# Patient Record
Sex: FEMALE | Race: WHITE | NOT HISPANIC OR LATINO | Employment: UNEMPLOYED | URBAN - NONMETROPOLITAN AREA
[De-identification: names, ages, dates, MRNs, and addresses within clinical notes are randomized per-mention and may not be internally consistent; named-entity substitution may affect disease eponyms.]

---

## 2018-06-18 ENCOUNTER — HISTORICAL (OUTPATIENT)
Dept: ADMINISTRATIVE | Facility: HOSPITAL | Age: 53
End: 2018-06-18

## 2019-07-10 ENCOUNTER — HISTORICAL (OUTPATIENT)
Dept: ADMINISTRATIVE | Facility: HOSPITAL | Age: 54
End: 2019-07-10

## 2020-10-27 ENCOUNTER — HISTORICAL (OUTPATIENT)
Dept: ADMINISTRATIVE | Facility: HOSPITAL | Age: 55
End: 2020-10-27

## 2020-10-27 LAB — BCS RECOMMENDATION EXT: NORMAL

## 2021-12-22 ENCOUNTER — HISTORICAL (OUTPATIENT)
Dept: ADMINISTRATIVE | Facility: HOSPITAL | Age: 56
End: 2021-12-22

## 2021-12-22 LAB — BCS RECOMMENDATION EXT: NORMAL

## 2022-04-07 ENCOUNTER — HISTORICAL (OUTPATIENT)
Dept: ADMINISTRATIVE | Facility: HOSPITAL | Age: 57
End: 2022-04-07

## 2022-04-23 VITALS
WEIGHT: 293 LBS | BODY MASS INDEX: 45.99 KG/M2 | DIASTOLIC BLOOD PRESSURE: 66 MMHG | OXYGEN SATURATION: 97 % | SYSTOLIC BLOOD PRESSURE: 130 MMHG | HEIGHT: 67 IN

## 2022-10-05 ENCOUNTER — DOCUMENTATION ONLY (OUTPATIENT)
Dept: ADMINISTRATIVE | Facility: HOSPITAL | Age: 57
End: 2022-10-05

## 2023-01-19 ENCOUNTER — DOCUMENTATION ONLY (OUTPATIENT)
Dept: ADMINISTRATIVE | Facility: HOSPITAL | Age: 58
End: 2023-01-19
Payer: COMMERCIAL

## 2023-06-06 DIAGNOSIS — Z12.31 SCREENING MAMMOGRAM, ENCOUNTER FOR: Primary | ICD-10-CM

## 2023-06-12 ENCOUNTER — HOSPITAL ENCOUNTER (OUTPATIENT)
Dept: RADIOLOGY | Facility: HOSPITAL | Age: 58
Discharge: HOME | End: 2023-06-12
Attending: FAMILY MEDICINE
Payer: COMMERCIAL

## 2023-06-12 VITALS — HEIGHT: 67 IN | WEIGHT: 293 LBS | BODY MASS INDEX: 45.99 KG/M2

## 2023-06-12 DIAGNOSIS — Z12.31 SCREENING MAMMOGRAM, ENCOUNTER FOR: ICD-10-CM

## 2023-06-12 PROCEDURE — 77067 SCR MAMMO BI INCL CAD: CPT | Mod: TC,52

## 2023-06-13 ENCOUNTER — TELEPHONE (OUTPATIENT)
Dept: FAMILY MEDICINE | Facility: CLINIC | Age: 58
End: 2023-06-13
Payer: COMMERCIAL

## 2023-06-13 NOTE — TELEPHONE ENCOUNTER
Rt call no answer left message          ----- Message from Fritz Jeff MD sent at 6/13/2023 12:39 PM CDT -----  Mammogram okay.  Repeat 1 year.

## 2023-06-26 LAB
ALBUMIN SERPL-MCNC: 4.5 G/DL (ref 3.8–4.9)
ALBUMIN/GLOB SERPL: 1.7 {RATIO} (ref 1.2–2.2)
ALP SERPL-CCNC: 114 IU/L (ref 44–121)
ALT SERPL-CCNC: 16 IU/L (ref 0–32)
AST SERPL-CCNC: 19 IU/L (ref 0–40)
BASOPHILS # BLD AUTO: 0.1 X10E3/UL (ref 0–0.2)
BASOPHILS NFR BLD AUTO: 1 %
BILIRUB SERPL-MCNC: 0.7 MG/DL (ref 0–1.2)
BUN SERPL-MCNC: 15 MG/DL (ref 8–27)
BUN/CREAT SERPL: 21 (ref 12–28)
CALCIUM SERPL-MCNC: 9.6 MG/DL (ref 8.7–10.3)
CHLORIDE SERPL-SCNC: 101 MMOL/L (ref 96–106)
CHOLEST SERPL-MCNC: 205 MG/DL (ref 100–199)
CO2 SERPL-SCNC: 26 MMOL/L (ref 20–29)
CREAT SERPL-MCNC: 0.73 MG/DL (ref 0.57–1)
EOSINOPHIL # BLD AUTO: 0.2 X10E3/UL (ref 0–0.4)
EOSINOPHIL NFR BLD AUTO: 2 %
ERYTHROCYTE [DISTWIDTH] IN BLOOD BY AUTOMATED COUNT: 12.3 % (ref 11.7–15.4)
EST. GFR  (NO RACE VARIABLE): 94 ML/MIN/1.73
FOLATE SERPL-MCNC: 12.5 NG/ML
GLOBULIN SER CALC-MCNC: 2.7 G/DL (ref 1.5–4.5)
GLUCOSE SERPL-MCNC: 114 MG/DL (ref 70–99)
HBA1C MFR BLD: 6 % (ref 4.8–5.6)
HCT VFR BLD AUTO: 44.3 % (ref 34–46.6)
HDLC SERPL-MCNC: 60 MG/DL
HGB BLD-MCNC: 14.3 G/DL (ref 11.1–15.9)
IMM GRANULOCYTES NFR BLD AUTO: 0 %
LDLC SERPL CALC-MCNC: 129 MG/DL (ref 0–99)
LYMPHOCYTES # BLD AUTO: 1.9 X10E3/UL (ref 0.7–3.1)
LYMPHOCYTES NFR BLD AUTO: 22 %
MCH RBC QN AUTO: 30.8 PG (ref 26.6–33)
MCHC RBC AUTO-ENTMCNC: 32.3 G/DL (ref 31.5–35.7)
MCV RBC AUTO: 95 FL (ref 79–97)
MONOCYTES # BLD AUTO: 0.6 X10E3/UL (ref 0.1–0.9)
MONOCYTES NFR BLD AUTO: 7 %
NEUTROPHILS # BLD AUTO: 5.9 X10E3/UL (ref 1.4–7)
NEUTROPHILS NFR BLD AUTO: 68 %
PLATELET # BLD AUTO: 331 X10E3/UL (ref 150–450)
POTASSIUM SERPL-SCNC: 4.5 MMOL/L (ref 3.5–5.2)
PROT SERPL-MCNC: 7.2 G/DL (ref 6–8.5)
RBC # BLD AUTO: 4.65 X10E6/UL (ref 3.77–5.28)
SODIUM SERPL-SCNC: 141 MMOL/L (ref 134–144)
TRIGL SERPL-MCNC: 92 MG/DL (ref 0–149)
TSH SERPL DL<=0.005 MIU/L-ACNC: 1.61 UIU/ML (ref 0.45–4.5)
VIT B12 SERPL-MCNC: 306 PG/ML (ref 232–1245)
VLDLC SERPL CALC-MCNC: 16 MG/DL (ref 5–40)
WBC # BLD AUTO: 8.7 X10E3/UL (ref 3.4–10.8)

## 2023-06-28 ENCOUNTER — OFFICE VISIT (OUTPATIENT)
Dept: FAMILY MEDICINE | Facility: CLINIC | Age: 58
End: 2023-06-28
Payer: COMMERCIAL

## 2023-06-28 VITALS
SYSTOLIC BLOOD PRESSURE: 138 MMHG | BODY MASS INDEX: 45.99 KG/M2 | OXYGEN SATURATION: 96 % | HEIGHT: 67 IN | TEMPERATURE: 98 F | HEART RATE: 98 BPM | WEIGHT: 293 LBS | DIASTOLIC BLOOD PRESSURE: 72 MMHG

## 2023-06-28 DIAGNOSIS — E66.01 MORBID OBESITY: ICD-10-CM

## 2023-06-28 DIAGNOSIS — E03.9 ACQUIRED HYPOTHYROIDISM: ICD-10-CM

## 2023-06-28 DIAGNOSIS — J01.00 ACUTE NON-RECURRENT MAXILLARY SINUSITIS: ICD-10-CM

## 2023-06-28 DIAGNOSIS — I10 PRIMARY HYPERTENSION: Primary | ICD-10-CM

## 2023-06-28 DIAGNOSIS — Z12.11 SCREENING FOR COLON CANCER: ICD-10-CM

## 2023-06-28 DIAGNOSIS — Z85.3 HISTORY OF BREAST CANCER: ICD-10-CM

## 2023-06-28 DIAGNOSIS — E16.1 HYPERINSULINISM: ICD-10-CM

## 2023-06-28 DIAGNOSIS — R73.01 IFG (IMPAIRED FASTING GLUCOSE): ICD-10-CM

## 2023-06-28 PROBLEM — C50.919 MALIGNANT NEOPLASM OF BREAST: Status: RESOLVED | Noted: 2023-06-28 | Resolved: 2023-06-28

## 2023-06-28 PROBLEM — C50.919 MALIGNANT NEOPLASM OF BREAST: Status: ACTIVE | Noted: 2023-06-28

## 2023-06-28 PROCEDURE — 3078F PR MOST RECENT DIASTOLIC BLOOD PRESSURE < 80 MM HG: ICD-10-PCS | Mod: CPTII,,, | Performed by: FAMILY MEDICINE

## 2023-06-28 PROCEDURE — 1159F MED LIST DOCD IN RCRD: CPT | Mod: CPTII,,, | Performed by: FAMILY MEDICINE

## 2023-06-28 PROCEDURE — 4010F PR ACE/ARB THEARPY RXD/TAKEN: ICD-10-PCS | Mod: CPTII,,, | Performed by: FAMILY MEDICINE

## 2023-06-28 PROCEDURE — 3008F BODY MASS INDEX DOCD: CPT | Mod: CPTII,,, | Performed by: FAMILY MEDICINE

## 2023-06-28 PROCEDURE — 1160F PR REVIEW ALL MEDS BY PRESCRIBER/CLIN PHARMACIST DOCUMENTED: ICD-10-PCS | Mod: CPTII,,, | Performed by: FAMILY MEDICINE

## 2023-06-28 PROCEDURE — 3044F HG A1C LEVEL LT 7.0%: CPT | Mod: CPTII,,, | Performed by: FAMILY MEDICINE

## 2023-06-28 PROCEDURE — 99214 PR OFFICE/OUTPT VISIT, EST, LEVL IV, 30-39 MIN: ICD-10-PCS | Mod: ,,, | Performed by: FAMILY MEDICINE

## 2023-06-28 PROCEDURE — 4010F ACE/ARB THERAPY RXD/TAKEN: CPT | Mod: CPTII,,, | Performed by: FAMILY MEDICINE

## 2023-06-28 PROCEDURE — 99214 OFFICE O/P EST MOD 30 MIN: CPT | Mod: ,,, | Performed by: FAMILY MEDICINE

## 2023-06-28 PROCEDURE — 3078F DIAST BP <80 MM HG: CPT | Mod: CPTII,,, | Performed by: FAMILY MEDICINE

## 2023-06-28 PROCEDURE — 3008F PR BODY MASS INDEX (BMI) DOCUMENTED: ICD-10-PCS | Mod: CPTII,,, | Performed by: FAMILY MEDICINE

## 2023-06-28 PROCEDURE — 3075F SYST BP GE 130 - 139MM HG: CPT | Mod: CPTII,,, | Performed by: FAMILY MEDICINE

## 2023-06-28 PROCEDURE — 3044F PR MOST RECENT HEMOGLOBIN A1C LEVEL <7.0%: ICD-10-PCS | Mod: CPTII,,, | Performed by: FAMILY MEDICINE

## 2023-06-28 PROCEDURE — 1160F RVW MEDS BY RX/DR IN RCRD: CPT | Mod: CPTII,,, | Performed by: FAMILY MEDICINE

## 2023-06-28 PROCEDURE — 3075F PR MOST RECENT SYSTOLIC BLOOD PRESS GE 130-139MM HG: ICD-10-PCS | Mod: CPTII,,, | Performed by: FAMILY MEDICINE

## 2023-06-28 PROCEDURE — 1159F PR MEDICATION LIST DOCUMENTED IN MEDICAL RECORD: ICD-10-PCS | Mod: CPTII,,, | Performed by: FAMILY MEDICINE

## 2023-06-28 RX ORDER — LISINOPRIL 5 MG/1
5 TABLET ORAL DAILY
COMMUNITY
End: 2023-06-28 | Stop reason: SDUPTHER

## 2023-06-28 RX ORDER — LISINOPRIL 5 MG/1
5 TABLET ORAL DAILY
Qty: 90 TABLET | Refills: 3 | Status: SHIPPED | OUTPATIENT
Start: 2023-06-28 | End: 2023-06-28

## 2023-06-28 RX ORDER — LEVOFLOXACIN 500 MG/1
500 TABLET, FILM COATED ORAL DAILY
Qty: 10 TABLET | Refills: 0 | Status: SHIPPED | OUTPATIENT
Start: 2023-06-28 | End: 2023-07-08

## 2023-06-28 RX ORDER — LISINOPRIL 2.5 MG/1
2.5 TABLET ORAL DAILY
COMMUNITY
End: 2023-07-03

## 2023-06-28 RX ORDER — LEVOTHYROXINE SODIUM 100 UG/1
100 TABLET ORAL DAILY
Qty: 90 TABLET | Refills: 3 | Status: SHIPPED | OUTPATIENT
Start: 2023-06-28

## 2023-06-28 RX ORDER — LEVOTHYROXINE SODIUM 100 UG/1
100 TABLET ORAL DAILY
COMMUNITY
Start: 2023-03-29 | End: 2023-06-28 | Stop reason: SDUPTHER

## 2023-06-28 NOTE — ASSESSMENT & PLAN NOTE
For her morbid obesity, hyperinsulinism and impaired fasting glucose, therapeutic lifestyle changes to include regular exercise and weight loss.

## 2023-06-28 NOTE — PROGRESS NOTES
"SUBJECTIVE:  HPI    Ling Cool is a 58 y.o. female here for Follow up-chronic medical cond.     She reports that she got influenza about 2 weeks ago.  She has persistent nasal congestion and nasal pressure that has intensified in the last couple of days.  She is also having nocturnal fever for the last few days.  She has postnasal drip and cough.      Otherwise, she had been doing well.  No current problems or concerns.  She denies any chest pain or shortness a breath.  She would like to wait until after the 1st of the year to perform repeat colorectal cancer screening.  Her last colonoscopy was normal in September of 2013.    Leilanis allergies, medications, history, and problem list were updated as appropriate.    ROS:  Pertinent ROS as above, otherwise negative    OBJECTIVE:  Vital signs  Visit Vitals  /72 (BP Location: Right arm, Patient Position: Sitting)   Pulse 98   Temp 97.8 °F (36.6 °C) (Temporal)   Ht 5' 6.93" (1.7 m)   Wt (!) 165.9 kg (365 lb 12.8 oz)   SpO2 96%   BMI 57.41 kg/m²          PHYSICAL EXAM:  General:  Awake, alert, no acute distress   Eyes:  Pupils equal, round, reactive to light.  Conjunctiva normal bilaterally.  Ears:  Bilateral external auditory canals normal.  Bilateral tympanic membranes normal.  Nares:  Bilateral turbinate erythema and edema with clear rhinorrhea.  Oropharynx:  Postnasal drainage present.  No erythema or exudate.  Neck:  No lymphadenopathy, no bruit  Cardiovascular: Regular rate and rhythm.  No murmurs.  Respiratory: Clear to auscultation bilaterally, normal effort  Extremities:  No peripheral edema, no cyanosis  Skin: No rashes    Chemistry:  Lab Results   Component Value Date     06/22/2023    K 4.5 06/22/2023    BUN 15 06/22/2023    CREATININE 0.73 06/22/2023    EGFRNORACEVR 94 06/22/2023    CALCIUM 9.6 06/22/2023    AST 19 06/22/2023    ALT 16 06/22/2023    TSH 1.610 06/22/2023        Lab Results   Component Value Date    HGBA1C 6.0 (H) 06/22/2023    "     Hematology:  Lab Results   Component Value Date    WBC 8.7 06/22/2023    HGB 14.3 06/22/2023    MCV 95 06/22/2023     06/22/2023       Lipid Panel:  Lab Results   Component Value Date    CHOL 205 (H) 06/22/2023    HDL 60 06/22/2023    TRIG 92 06/22/2023        ASSESSMENT/PLAN:  1. Primary hypertension  Assessment & Plan:  Controlled on lisinopril 5 mg daily    Orders:  -     CBC Auto Differential; Future; Expected date: 12/28/2023  -     Comprehensive Metabolic Panel; Future; Expected date: 12/28/2023  -     Lipid Panel; Future; Expected date: 12/28/2023    2. Acquired hypothyroidism  Overview:  Lab Results   Component Value Date    TSH 1.610 06/22/2023         Assessment & Plan:  Stable and TSH at goal on levothyroxine 100 mcg daily    Orders:  -     TSH; Future; Expected date: 12/28/2023    3. Hyperinsulinism  -     Hemoglobin A1C; Future; Expected date: 12/28/2023  -     Insulin, Random; Future; Expected date: 12/28/2023    4. IFG (impaired fasting glucose)  Overview:  Lab Results   Component Value Date    HGBA1C 6.0 (H) 06/22/2023         Orders:  -     Hemoglobin A1C; Future; Expected date: 12/28/2023  -     Insulin, Random; Future; Expected date: 12/28/2023    5. Morbid obesity  Assessment & Plan:  For her morbid obesity, hyperinsulinism and impaired fasting glucose, therapeutic lifestyle changes to include regular exercise and weight loss.      6. History of breast cancer  Overview:  2006, status post left mastectomy    Assessment & Plan:  Mammogram up-to-date      7. Screening for colon cancer  Assessment & Plan:  Refer for screening colonoscopy.  Last colonoscopy was normal in September 2013.    Orders:  -     Ambulatory referral/consult to Gastroenterology; Future; Expected date: 07/05/2023    8. Acute non-recurrent maxillary sinusitis  -     levoFLOXacin (LEVAQUIN) 500 MG tablet; Take 1 tablet (500 mg total) by mouth once daily. for 10 days  Dispense: 10 tablet; Refill: 0    Other orders  -      levothyroxine (SYNTHROID) 100 MCG tablet; Take 1 tablet (100 mcg total) by mouth Daily.  Dispense: 90 tablet; Refill: 3  -     lisinopriL (PRINIVIL,ZESTRIL) 5 MG tablet; Take 1 tablet (5 mg total) by mouth once daily. Takes 1/2 tab (2.5mg)  Dispense: 90 tablet; Refill: 3      Follow Up:  Follow up in about 6 months (around 12/28/2023) for Fasting labs, Follow-up.

## 2023-07-03 DIAGNOSIS — I10 PRIMARY HYPERTENSION: Primary | ICD-10-CM

## 2023-07-03 RX ORDER — LISINOPRIL 5 MG/1
2.5 TABLET ORAL DAILY
Qty: 45 TABLET | Refills: 0 | Status: SHIPPED | OUTPATIENT
Start: 2023-07-03 | End: 2023-10-02

## 2023-10-02 DIAGNOSIS — I10 PRIMARY HYPERTENSION: ICD-10-CM

## 2023-10-02 PROBLEM — J01.00 ACUTE NON-RECURRENT MAXILLARY SINUSITIS: Status: RESOLVED | Noted: 2023-06-28 | Resolved: 2023-10-02

## 2023-10-02 RX ORDER — LISINOPRIL 5 MG/1
TABLET ORAL
Qty: 45 TABLET | Refills: 0 | Status: SHIPPED | OUTPATIENT
Start: 2023-10-02 | End: 2024-01-03 | Stop reason: SDUPTHER

## 2023-11-22 ENCOUNTER — TELEPHONE (OUTPATIENT)
Dept: GASTROENTEROLOGY | Facility: CLINIC | Age: 58
End: 2023-11-22
Payer: COMMERCIAL

## 2023-11-22 DIAGNOSIS — Z12.11 SCREENING FOR COLON CANCER: Primary | ICD-10-CM

## 2023-11-22 NOTE — TELEPHONE ENCOUNTER
Patient was notified of RMM's long term and agreed to switch over to [facility]. Patient's scheduled procedure w/ RMM on 1/9/24 has been cancelled. Colonoscopy scheduled w/ [facility] Friday 3/8/24 at [facility]. [facility] schedule updated. A new order has been made. - dmp

## 2023-12-22 LAB
ALBUMIN SERPL-MCNC: 4.6 G/DL (ref 3.8–4.9)
ALBUMIN/GLOB SERPL: 1.7 {RATIO} (ref 1.2–2.2)
ALP SERPL-CCNC: 101 IU/L (ref 44–121)
ALT SERPL-CCNC: 14 IU/L (ref 0–32)
AST SERPL-CCNC: 20 IU/L (ref 0–40)
BASOPHILS # BLD AUTO: 0.1 X10E3/UL (ref 0–0.2)
BASOPHILS NFR BLD AUTO: 1 %
BILIRUB SERPL-MCNC: 1 MG/DL (ref 0–1.2)
BUN SERPL-MCNC: 13 MG/DL (ref 8–27)
BUN/CREAT SERPL: 18 (ref 12–28)
CALCIUM SERPL-MCNC: 9.4 MG/DL (ref 8.7–10.3)
CHLORIDE SERPL-SCNC: 100 MMOL/L (ref 96–106)
CHOLEST SERPL-MCNC: 193 MG/DL (ref 100–199)
CO2 SERPL-SCNC: 26 MMOL/L (ref 20–29)
CREAT SERPL-MCNC: 0.74 MG/DL (ref 0.57–1)
EOSINOPHIL # BLD AUTO: 0.1 X10E3/UL (ref 0–0.4)
EOSINOPHIL NFR BLD AUTO: 2 %
ERYTHROCYTE [DISTWIDTH] IN BLOOD BY AUTOMATED COUNT: 12.1 % (ref 11.7–15.4)
EST. GFR  (NO RACE VARIABLE): 93 ML/MIN/1.73
GLOBULIN SER CALC-MCNC: 2.7 G/DL (ref 1.5–4.5)
GLUCOSE SERPL-MCNC: 106 MG/DL (ref 70–99)
HBA1C MFR BLD: 5.6 % (ref 4.8–5.6)
HCT VFR BLD AUTO: 43.4 % (ref 34–46.6)
HDLC SERPL-MCNC: 61 MG/DL
HGB BLD-MCNC: 14 G/DL (ref 11.1–15.9)
IMM GRANULOCYTES NFR BLD AUTO: 0 %
INSULIN SERPL-ACNC: 20.3 UIU/ML (ref 2.6–24.9)
LDLC SERPL CALC-MCNC: 117 MG/DL (ref 0–99)
LYMPHOCYTES # BLD AUTO: 1 X10E3/UL (ref 0.7–3.1)
LYMPHOCYTES NFR BLD AUTO: 19 %
MCH RBC QN AUTO: 31.9 PG (ref 26.6–33)
MCHC RBC AUTO-ENTMCNC: 32.3 G/DL (ref 31.5–35.7)
MCV RBC AUTO: 99 FL (ref 79–97)
MONOCYTES # BLD AUTO: 0.5 X10E3/UL (ref 0.1–0.9)
MONOCYTES NFR BLD AUTO: 9 %
NEUTROPHILS # BLD AUTO: 3.6 X10E3/UL (ref 1.4–7)
NEUTROPHILS NFR BLD AUTO: 69 %
PLATELET # BLD AUTO: 283 X10E3/UL (ref 150–450)
POTASSIUM SERPL-SCNC: 4.5 MMOL/L (ref 3.5–5.2)
PROT SERPL-MCNC: 7.3 G/DL (ref 6–8.5)
RBC # BLD AUTO: 4.39 X10E6/UL (ref 3.77–5.28)
SODIUM SERPL-SCNC: 140 MMOL/L (ref 134–144)
TRIGL SERPL-MCNC: 85 MG/DL (ref 0–149)
TSH SERPL DL<=0.005 MIU/L-ACNC: 1.95 UIU/ML (ref 0.45–4.5)
VLDLC SERPL CALC-MCNC: 15 MG/DL (ref 5–40)
WBC # BLD AUTO: 5.2 X10E3/UL (ref 3.4–10.8)

## 2024-01-03 ENCOUNTER — OFFICE VISIT (OUTPATIENT)
Dept: FAMILY MEDICINE | Facility: CLINIC | Age: 59
End: 2024-01-03
Payer: COMMERCIAL

## 2024-01-03 VITALS
WEIGHT: 293 LBS | HEART RATE: 75 BPM | DIASTOLIC BLOOD PRESSURE: 70 MMHG | BODY MASS INDEX: 45.99 KG/M2 | TEMPERATURE: 97 F | HEIGHT: 67 IN | OXYGEN SATURATION: 97 % | SYSTOLIC BLOOD PRESSURE: 134 MMHG

## 2024-01-03 DIAGNOSIS — E16.1 HYPERINSULINISM: ICD-10-CM

## 2024-01-03 DIAGNOSIS — R73.01 IFG (IMPAIRED FASTING GLUCOSE): ICD-10-CM

## 2024-01-03 DIAGNOSIS — I10 PRIMARY HYPERTENSION: Primary | ICD-10-CM

## 2024-01-03 DIAGNOSIS — E03.9 ACQUIRED HYPOTHYROIDISM: ICD-10-CM

## 2024-01-03 DIAGNOSIS — R06.2 WHEEZING: ICD-10-CM

## 2024-01-03 DIAGNOSIS — J01.00 ACUTE NON-RECURRENT MAXILLARY SINUSITIS: ICD-10-CM

## 2024-01-03 DIAGNOSIS — E66.01 MORBID OBESITY: ICD-10-CM

## 2024-01-03 PROCEDURE — 3008F BODY MASS INDEX DOCD: CPT | Mod: CPTII,,, | Performed by: FAMILY MEDICINE

## 2024-01-03 PROCEDURE — 3078F DIAST BP <80 MM HG: CPT | Mod: CPTII,,, | Performed by: FAMILY MEDICINE

## 2024-01-03 PROCEDURE — 1159F MED LIST DOCD IN RCRD: CPT | Mod: CPTII,,, | Performed by: FAMILY MEDICINE

## 2024-01-03 PROCEDURE — 3075F SYST BP GE 130 - 139MM HG: CPT | Mod: CPTII,,, | Performed by: FAMILY MEDICINE

## 2024-01-03 PROCEDURE — 4010F ACE/ARB THERAPY RXD/TAKEN: CPT | Mod: CPTII,,, | Performed by: FAMILY MEDICINE

## 2024-01-03 PROCEDURE — 99214 OFFICE O/P EST MOD 30 MIN: CPT | Mod: ,,, | Performed by: FAMILY MEDICINE

## 2024-01-03 PROCEDURE — 1160F RVW MEDS BY RX/DR IN RCRD: CPT | Mod: CPTII,,, | Performed by: FAMILY MEDICINE

## 2024-01-03 RX ORDER — PREDNISONE 20 MG/1
60 TABLET ORAL DAILY
Qty: 21 TABLET | Refills: 0 | Status: SHIPPED | OUTPATIENT
Start: 2024-01-03 | End: 2024-01-10

## 2024-01-03 RX ORDER — LISINOPRIL 2.5 MG/1
2.5 TABLET ORAL DAILY
Qty: 90 TABLET | Refills: 3 | Status: SHIPPED | OUTPATIENT
Start: 2024-01-03 | End: 2025-01-02

## 2024-01-03 RX ORDER — ALBUTEROL SULFATE 90 UG/1
2 AEROSOL, METERED RESPIRATORY (INHALATION) EVERY 4 HOURS PRN
Qty: 18 G | Refills: 0 | Status: SHIPPED | OUTPATIENT
Start: 2024-01-03 | End: 2024-01-08 | Stop reason: SDUPTHER

## 2024-01-03 RX ORDER — LEVOFLOXACIN 500 MG/1
500 TABLET, FILM COATED ORAL DAILY
Qty: 10 TABLET | Refills: 0 | Status: SHIPPED | OUTPATIENT
Start: 2024-01-03 | End: 2024-01-13

## 2024-01-03 NOTE — PROGRESS NOTES
"SUBJECTIVE:  HPI    Ling Cool is a 58 y.o. female here for Follow-up (6mth f/u labs, cough congestion x2wks).     She had a URI illness around 2-3 weeks ago.  She has had persistent cough, chest congestion, sinus congestion since that time and it has not improved with the use of over-the-counter medication.  She denies any fever or chills.      Otherwise, she is doing well in his without any problems or concerns.    Her colonoscopy is scheduled for March of 2024.    Ling's allergies, medications, history, and problem list were updated as appropriate.    ROS:  Pertinent ROS as above, otherwise negative 12 point review of systems    OBJECTIVE:  Vital signs  Visit Vitals  /70 (BP Location: Right forearm)   Pulse 75   Temp 97 °F (36.1 °C) (Oral)   Ht 5' 7" (1.702 m)   Wt (!) 169.6 kg (373 lb 12.8 oz)   SpO2 97%   BMI 58.55 kg/m²          PHYSICAL EXAM:  General: Awake, alert, no acute distress, obese  ENT:  External auditory canals normal bilaterally .  Tympanic membranes normal bilaterally.  Oropharynx clear.  Bilateral turbinate erythema with cloudy rhinorrhea.  Neck:  No bruits, no masses  Cardiovascular:  Regular rhythm.  Normal rate.  No murmurs.  Respiratory:  Normal respiratory effort with bilateral expiratory wheezes more prominent in bilateral lower lobes but present throughout all lung fields  Extremities:  No cyanosis, no clubbing, no edema  Skin:  No rashes or appreciable lesions   Neuro:  Cranial nerves 2-12 are intact with usual testing.  Gait is normal.  Moves all 4 extremities equally and symmetrically.  Psychiatric:  Normal mood and affect.    Chemistry:  Lab Results   Component Value Date     12/20/2023    K 4.5 12/20/2023    BUN 13 12/20/2023    CREATININE 0.74 12/20/2023    EGFRNORACEVR 93 12/20/2023    CALCIUM 9.4 12/20/2023    AST 20 12/20/2023    ALT 14 12/20/2023    TSH 1.950 12/20/2023        Lab Results   Component Value Date    HGBA1C 5.6 12/20/2023        Hematology:  Lab " Results   Component Value Date    WBC 5.2 12/20/2023    HGB 14.0 12/20/2023    MCV 99 (H) 12/20/2023     12/20/2023       Lipid Panel:  Lab Results   Component Value Date    CHOL 193 12/20/2023    HDL 61 12/20/2023    TRIG 85 12/20/2023        ASSESSMENT/PLAN:  1. Primary hypertension  Assessment & Plan:  Controlled on lisinopril 2.5 mg daily    Orders:  -     lisinopriL (PRINIVIL,ZESTRIL) 2.5 MG tablet; Take 1 tablet (2.5 mg total) by mouth once daily.  Dispense: 90 tablet; Refill: 3  -     CBC Auto Differential; Future; Expected date: 07/03/2024  -     Comprehensive Metabolic Panel; Future; Expected date: 07/03/2024  -     Lipid Panel; Future; Expected date: 07/03/2024    2. Acquired hypothyroidism  Overview:  Lab Results   Component Value Date    TSH 1.950 12/20/2023         Assessment & Plan:  Stable and TSH at goal on levothyroxine 100 mcg daily    Orders:  -     Lipid Panel; Future; Expected date: 07/03/2024  -     TSH; Future; Expected date: 07/03/2024    3. IFG (impaired fasting glucose)  Overview:  Lab Results   Component Value Date    HGBA1C 5.6 12/20/2023         Assessment & Plan:  Therapeutic lifestyle changes to include low-carbohydrate, low-fat diet and regular exercise    Orders:  -     Hemoglobin A1C; Future; Expected date: 07/03/2024    4. Morbid obesity  Assessment & Plan:  Therapeutic lifestyle changes to include regular exercise and weight loss.      5. Hyperinsulinism  Assessment & Plan:  Therapeutic lifestyle changes as above    Orders:  -     Hemoglobin A1C; Future; Expected date: 07/03/2024    6. Acute non-recurrent maxillary sinusitis  -     levoFLOXacin (LEVAQUIN) 500 MG tablet; Take 1 tablet (500 mg total) by mouth once daily. for 10 days  Dispense: 10 tablet; Refill: 0    7. Wheezing  -     albuterol (VENTOLIN HFA) 90 mcg/actuation inhaler; Inhale 2 puffs into the lungs every 4 (four) hours as needed for Wheezing (cough/wheezing/shortness of breath). Rescue  Dispense: 18 g;  Refill: 0  -     predniSONE (DELTASONE) 20 MG tablet; Take 3 tablets (60 mg total) by mouth once daily. for 7 days  Dispense: 21 tablet; Refill: 0      Follow Up:  Follow up in about 6 months (around 7/3/2024) for Fasting labs, Follow-up.

## 2024-01-04 ENCOUNTER — TELEPHONE (OUTPATIENT)
Dept: FAMILY MEDICINE | Facility: CLINIC | Age: 59
End: 2024-01-04
Payer: COMMERCIAL

## 2024-01-08 ENCOUNTER — TELEPHONE (OUTPATIENT)
Dept: FAMILY MEDICINE | Facility: CLINIC | Age: 59
End: 2024-01-08
Payer: COMMERCIAL

## 2024-01-08 DIAGNOSIS — R06.2 WHEEZING: ICD-10-CM

## 2024-01-08 RX ORDER — ALBUTEROL SULFATE 90 UG/1
2 AEROSOL, METERED RESPIRATORY (INHALATION) EVERY 4 HOURS PRN
Qty: 18 G | Refills: 0 | Status: SHIPPED | OUTPATIENT
Start: 2024-01-08 | End: 2024-02-07

## 2024-01-08 NOTE — TELEPHONE ENCOUNTER
Pt states that she can't get the albuterol inh due to the PA being denied. States that they sent the denial to her Wednesday. Looks like it was denied again on Friday. Pt states that she is still wheezing due to not being able to geth the inh. Please advise.         SHREYA Gonzalez        [phone] - [phone]   None known

## 2024-01-09 ENCOUNTER — TELEPHONE (OUTPATIENT)
Dept: FAMILY MEDICINE | Facility: CLINIC | Age: 59
End: 2024-01-09
Payer: COMMERCIAL

## 2024-03-01 ENCOUNTER — TELEPHONE (OUTPATIENT)
Dept: GASTROENTEROLOGY | Facility: CLINIC | Age: 59
End: 2024-03-01
Payer: COMMERCIAL

## 2024-03-01 VITALS — HEIGHT: 67 IN | WEIGHT: 293 LBS | BODY MASS INDEX: 45.99 KG/M2

## 2024-03-01 DIAGNOSIS — Z12.11 SCREENING FOR COLON CANCER: Primary | ICD-10-CM

## 2024-03-01 NOTE — TELEPHONE ENCOUNTER
"[facility]  [address] [place] [address]  Phone: [phone]  Fax: [phone]    History & Physical         Provider: Dr. Roxanne Shin    Patient Name: Ling GREEN (age):1965  58 y.o.           Gender: female   Phone: [phone]     Referring Physician: Fritz Jeff     Vital Signs:   Height - 5' 7"  Weight - 373 lb  BMI -  58.42    Plan: Colonoscopy @ [facility]    Encounter Diagnosis   Name Primary?    Screening for colon cancer Yes           History:      Past Medical History:   Diagnosis Date    BMI 50.0-59.9, adult     History of breast cancer     Hyperinsulinism 2023    Hypertension 2023    Hypothyroidism 2023    Lab Results Component Value Date  TSH 1.610 2023      IFG (impaired fasting glucose) 2023    Lab Results Component Value Date  HGBA1C 6.0 (H) 2023      Morbid obesity 2023      Past Surgical History:   Procedure Laterality Date    CATARACT EXTRACTION Bilateral     CHOLECYSTECTOMY      COLONOSCOPY  2013    Normal, repeat 10 years    MASTECTOMY Left 2006    OOPHORECTOMY  1986    WISDOM TOOTH EXTRACTION        Medication List with Changes/Refills   Current Medications    ALBUTEROL (VENTOLIN HFA) 90 MCG/ACTUATION INHALER    Inhale 2 puffs into the lungs every 4 (four) hours as needed for Wheezing (cough/wheezing/shortness of breath). Rescue    LEVOTHYROXINE (SYNTHROID) 100 MCG TABLET    Take 1 tablet (100 mcg total) by mouth Daily.    LISINOPRIL (PRINIVIL,ZESTRIL) 2.5 MG TABLET    Take 1 tablet (2.5 mg total) by mouth once daily.    SOD SULF-POT CHLORIDE-MAG SULF (SUTAB) 1.479-0.188- 0.225 GRAM TABLET    Take 12 tablets by mouth once daily. Take according to package instructions with indicated amount of water. No breakfast day before test. May substitute with Suprep, Clenpiq, Plenvu, Moviprep or GoLytely based on Rx plan " and patient preference.      Review of patient's allergies indicates:   Allergen Reactions    Celecoxib Hives and Shortness Of Breath    Penicillin Anaphylaxis    Sulfa (sulfonamide antibiotics)       Family History   Problem Relation Age of Onset    No Known Problems Mother     Heart attack Father 70    No Known Problems Sister     No Known Problems Sister     Other Sister 71        COVID    No Known Problems Brother     No Known Problems Maternal Grandmother     No Known Problems Maternal Grandfather     No Known Problems Paternal Grandmother     No Known Problems Paternal Grandfather     Hypertension Other     Osteoarthritis Other       Social History     Tobacco Use    Smoking status: Never    Smokeless tobacco: Never   Substance Use Topics    Alcohol use: Never    Drug use: Never        Physical Examination:     General Appearance:___________________________  HEENT: _____________________________________  Abdomen:____________________________________  Heart:________________________________________  Lungs:_______________________________________  Extremities:___________________________________  Skin:_________________________________________  Endocrine:____________________________________  Genitourinary:_________________________________  Neurological:__________________________________      Patient has been evaluated immediately prior to sedation and is medically cleared for endoscopy with IVCS as an ASA class: ______      Physician Signature: _________________________       Date: ________  Time: ________

## 2024-03-01 NOTE — TELEPHONE ENCOUNTER
S/w pt and told her that I was calling as a courtesy regarding her upcoming COLON with [facility]BP on 3/8/24, Fri, and wanted to verify that she had her paper prep instructions and meds. Pt stated she has both. I also mentioned that [facility] will call the day before (THURS) with the arrival time, GI Lab is located on the third floor, and to pre-register before next Friday. [place]

## 2024-03-08 ENCOUNTER — OUTSIDE PLACE OF SERVICE (OUTPATIENT)
Dept: GASTROENTEROLOGY | Facility: CLINIC | Age: 59
End: 2024-03-08

## 2024-03-08 LAB — CRC RECOMMENDATION EXT: NORMAL

## 2024-03-08 PROCEDURE — 45385 COLONOSCOPY W/LESION REMOVAL: CPT | Mod: 33,,, | Performed by: INTERNAL MEDICINE

## 2024-03-17 ENCOUNTER — TELEPHONE (OUTPATIENT)
Dept: GASTROENTEROLOGY | Facility: CLINIC | Age: 59
End: 2024-03-17
Payer: COMMERCIAL

## 2024-03-17 NOTE — TELEPHONE ENCOUNTER
1 TA, 1 hyp, repeat colonoscopy in 3 years.     Notify patient. Confirm recall tab in appointment desk is up-to-date (update if needed).  [facility]

## 2024-03-19 NOTE — TELEPHONE ENCOUNTER
Results discussed with patient per providers chart remarks. Patient voices understanding/agreement. Results sent to PCP.  Recall verified/updated. -Regi FIERRO CMA

## 2024-03-27 ENCOUNTER — DOCUMENTATION ONLY (OUTPATIENT)
Dept: GASTROENTEROLOGY | Facility: CLINIC | Age: 59
End: 2024-03-27
Payer: COMMERCIAL

## 2024-04-12 ENCOUNTER — PATIENT MESSAGE (OUTPATIENT)
Dept: FAMILY MEDICINE | Facility: CLINIC | Age: 59
End: 2024-04-12
Payer: COMMERCIAL

## 2024-06-21 DIAGNOSIS — Z12.31 BREAST CANCER SCREENING BY MAMMOGRAM: Primary | ICD-10-CM

## 2024-07-03 ENCOUNTER — TELEPHONE (OUTPATIENT)
Dept: FAMILY MEDICINE | Facility: CLINIC | Age: 59
End: 2024-07-03
Payer: COMMERCIAL

## 2024-07-03 DIAGNOSIS — E03.9 ACQUIRED HYPOTHYROIDISM: Primary | ICD-10-CM

## 2024-07-03 RX ORDER — LEVOTHYROXINE SODIUM 100 UG/1
100 TABLET ORAL DAILY
Qty: 90 TABLET | Refills: 3 | Status: SHIPPED | OUTPATIENT
Start: 2024-07-03

## 2024-07-18 ENCOUNTER — OFFICE VISIT (OUTPATIENT)
Dept: FAMILY MEDICINE | Facility: CLINIC | Age: 59
End: 2024-07-18
Payer: COMMERCIAL

## 2024-07-18 VITALS
HEIGHT: 67 IN | WEIGHT: 293 LBS | SYSTOLIC BLOOD PRESSURE: 132 MMHG | DIASTOLIC BLOOD PRESSURE: 70 MMHG | BODY MASS INDEX: 45.99 KG/M2 | HEART RATE: 96 BPM | TEMPERATURE: 99 F | OXYGEN SATURATION: 97 %

## 2024-07-18 DIAGNOSIS — E03.9 ACQUIRED HYPOTHYROIDISM: ICD-10-CM

## 2024-07-18 DIAGNOSIS — I10 BENIGN ESSENTIAL HYPERTENSION: Primary | ICD-10-CM

## 2024-07-18 DIAGNOSIS — E66.01 MORBID OBESITY: ICD-10-CM

## 2024-07-18 DIAGNOSIS — R73.01 IFG (IMPAIRED FASTING GLUCOSE): ICD-10-CM

## 2024-07-18 DIAGNOSIS — E16.1 HYPERINSULINISM: ICD-10-CM

## 2024-07-18 PROBLEM — Z12.11 SCREENING FOR COLON CANCER: Status: RESOLVED | Noted: 2023-06-28 | Resolved: 2024-07-18

## 2024-07-18 PROCEDURE — 1160F RVW MEDS BY RX/DR IN RCRD: CPT | Mod: CPTII,,, | Performed by: FAMILY MEDICINE

## 2024-07-18 PROCEDURE — 3008F BODY MASS INDEX DOCD: CPT | Mod: CPTII,,, | Performed by: FAMILY MEDICINE

## 2024-07-18 PROCEDURE — 4010F ACE/ARB THERAPY RXD/TAKEN: CPT | Mod: CPTII,,, | Performed by: FAMILY MEDICINE

## 2024-07-18 PROCEDURE — 99214 OFFICE O/P EST MOD 30 MIN: CPT | Mod: ,,, | Performed by: FAMILY MEDICINE

## 2024-07-18 PROCEDURE — 3078F DIAST BP <80 MM HG: CPT | Mod: CPTII,,, | Performed by: FAMILY MEDICINE

## 2024-07-18 PROCEDURE — 3044F HG A1C LEVEL LT 7.0%: CPT | Mod: CPTII,,, | Performed by: FAMILY MEDICINE

## 2024-07-18 PROCEDURE — 1159F MED LIST DOCD IN RCRD: CPT | Mod: CPTII,,, | Performed by: FAMILY MEDICINE

## 2024-07-18 PROCEDURE — 3075F SYST BP GE 130 - 139MM HG: CPT | Mod: CPTII,,, | Performed by: FAMILY MEDICINE

## 2024-07-18 NOTE — PROGRESS NOTES
"SUBJECTIVE:  HPI    Ling Cool is a 59 y.o. female here for Follow-up (Lab results) on chronic health conditions and recent lab work.  See assessment and plan for problems addressed during visit.      She has been doing well.  She has in the process of moving houses.  She was dealing with some mold in her old house.  Because she has been moving, she reports that she has not been eating very well lately.      Ling's allergies, medications, history, and problem list were updated as appropriate.    ROS:  Pertinent ROS as above, otherwise negative 12 point review of systems    OBJECTIVE:  Vital signs  Visit Vitals  /70 (BP Location: Right forearm, Patient Position: Sitting, BP Method: Large (Manual))   Pulse 96   Temp 99 °F (37.2 °C) (Temporal)   Ht 5' 7.01" (1.702 m)   Wt (!) 168.5 kg (371 lb 6.4 oz)   SpO2 97%   BMI 58.16 kg/m²          PHYSICAL EXAM:  General: Awake, alert, no acute distress  Neck:  No bruits, no masses  Cardiovascular:  Regular rhythm.  Normal rate.  No murmurs.  Respiratory: Clear to auscultation bilaterally, normal effort  Extremities:  No cyanosis, no clubbing, no edema  Skin:  No rashes or appreciable lesions   Psychiatric:  Normal mood and affect.    Chemistry:  Lab Results   Component Value Date     07/10/2024    K 4.7 07/10/2024    BUN 17 07/10/2024    CREATININE 0.69 07/10/2024    EGFRNORACEVR 99 07/10/2024    CALCIUM 9.4 07/10/2024    AST 16 07/10/2024    ALT 12 07/10/2024    TSH 1.900 07/10/2024        Lab Results   Component Value Date    HGBA1C 5.8 (H) 07/10/2024        Hematology:  Lab Results   Component Value Date    WBC 5.8 07/10/2024    HGB 13.6 07/10/2024    MCV 97 07/10/2024     07/10/2024       Lipid Panel:  Lab Results   Component Value Date    CHOL 200 (H) 07/10/2024    HDL 63 07/10/2024    TRIG 91 07/10/2024        ASSESSMENT/PLAN:  1. Benign essential hypertension  Assessment & Plan:  Controlled on lisinopril 2.5 mg daily      2. IFG (impaired fasting " glucose)  Overview:  Lab Results   Component Value Date    HGBA1C 5.8 (H) 07/10/2024         Assessment & Plan:  Therapeutic lifestyle changes to include low-carbohydrate, low-fat diet and regular exercise      3. Hyperinsulinism  Assessment & Plan:  Therapeutic lifestyle changes as above      4. Acquired hypothyroidism  Overview:  Lab Results   Component Value Date    TSH 1.900 07/10/2024         Assessment & Plan:  Stable and TSH at goal on levothyroxine 100 mcg daily      5. Morbid obesity  Assessment & Plan:  Therapeutic lifestyle changes to include regular exercise and weight loss.              Follow Up:  Follow up in about 6 months (around 1/18/2025) for chronic health conditions, Fasting labs.

## 2024-07-23 ENCOUNTER — HOSPITAL ENCOUNTER (OUTPATIENT)
Dept: RADIOLOGY | Facility: HOSPITAL | Age: 59
Discharge: HOME | End: 2024-07-23
Attending: FAMILY MEDICINE
Payer: COMMERCIAL

## 2024-07-23 DIAGNOSIS — Z12.31 BREAST CANCER SCREENING BY MAMMOGRAM: ICD-10-CM

## 2024-07-23 PROCEDURE — 77067 SCR MAMMO BI INCL CAD: CPT | Mod: TC,52

## 2024-07-23 PROCEDURE — 77063 BREAST TOMOSYNTHESIS BI: CPT | Mod: TC,52

## 2024-07-29 ENCOUNTER — TELEPHONE (OUTPATIENT)
Dept: FAMILY MEDICINE | Facility: CLINIC | Age: 59
End: 2024-07-29
Payer: COMMERCIAL

## 2024-07-29 NOTE — TELEPHONE ENCOUNTER
----- Message from Fritz Jeff MD sent at 7/29/2024  3:22 PM CDT -----  Mammogram okay.  Repeat 1 year.

## 2025-01-10 ENCOUNTER — TELEPHONE (OUTPATIENT)
Dept: FAMILY MEDICINE | Facility: CLINIC | Age: 60
End: 2025-01-10
Payer: COMMERCIAL

## 2025-01-10 DIAGNOSIS — I10 PRIMARY HYPERTENSION: ICD-10-CM

## 2025-01-10 RX ORDER — LISINOPRIL 2.5 MG/1
2.5 TABLET ORAL DAILY
Qty: 90 TABLET | Refills: 3 | Status: SHIPPED | OUTPATIENT
Start: 2025-01-10 | End: 2026-01-10

## 2025-01-21 ENCOUNTER — OFFICE VISIT (OUTPATIENT)
Dept: FAMILY MEDICINE | Facility: CLINIC | Age: 60
End: 2025-01-21
Payer: COMMERCIAL

## 2025-01-21 DIAGNOSIS — E66.01 MORBID OBESITY: ICD-10-CM

## 2025-01-21 DIAGNOSIS — E03.9 ACQUIRED HYPOTHYROIDISM: ICD-10-CM

## 2025-01-21 DIAGNOSIS — R73.01 IFG (IMPAIRED FASTING GLUCOSE): ICD-10-CM

## 2025-01-21 DIAGNOSIS — I10 BENIGN ESSENTIAL HYPERTENSION: Primary | ICD-10-CM

## 2025-01-21 DIAGNOSIS — E16.1 HYPERINSULINISM: ICD-10-CM

## 2025-01-21 PROCEDURE — 1159F MED LIST DOCD IN RCRD: CPT | Mod: CPTII,95,, | Performed by: FAMILY MEDICINE

## 2025-01-21 PROCEDURE — 1160F RVW MEDS BY RX/DR IN RCRD: CPT | Mod: CPTII,95,, | Performed by: FAMILY MEDICINE

## 2025-01-21 PROCEDURE — 98006 SYNCH AUDIO-VIDEO EST MOD 30: CPT | Mod: 95,,, | Performed by: FAMILY MEDICINE

## 2025-01-21 PROCEDURE — 4010F ACE/ARB THERAPY RXD/TAKEN: CPT | Mod: CPTII,95,, | Performed by: FAMILY MEDICINE

## 2025-01-21 PROCEDURE — 3044F HG A1C LEVEL LT 7.0%: CPT | Mod: CPTII,95,, | Performed by: FAMILY MEDICINE
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
## 2025-04-09 ENCOUNTER — PATIENT OUTREACH (OUTPATIENT)
Facility: CLINIC | Age: 60
End: 2025-04-09
Payer: COMMERCIAL

## 2025-07-07 DIAGNOSIS — E03.9 ACQUIRED HYPOTHYROIDISM: ICD-10-CM

## 2025-07-07 RX ORDER — LEVOTHYROXINE SODIUM 100 UG/1
100 TABLET ORAL
Qty: 90 TABLET | Refills: 3 | Status: SHIPPED | OUTPATIENT
Start: 2025-07-07

## 2025-07-30 ENCOUNTER — OFFICE VISIT (OUTPATIENT)
Dept: FAMILY MEDICINE | Facility: CLINIC | Age: 60
End: 2025-07-30
Payer: COMMERCIAL

## 2025-07-30 VITALS
TEMPERATURE: 98 F | DIASTOLIC BLOOD PRESSURE: 74 MMHG | HEIGHT: 67 IN | OXYGEN SATURATION: 95 % | WEIGHT: 293 LBS | SYSTOLIC BLOOD PRESSURE: 136 MMHG | HEART RATE: 65 BPM | BODY MASS INDEX: 45.99 KG/M2

## 2025-07-30 DIAGNOSIS — E03.9 ACQUIRED HYPOTHYROIDISM: ICD-10-CM

## 2025-07-30 DIAGNOSIS — Z11.59 NEED FOR HEPATITIS C SCREENING TEST: ICD-10-CM

## 2025-07-30 DIAGNOSIS — Z00.01 ENCOUNTER FOR WELL ADULT EXAM WITH ABNORMAL FINDINGS: Primary | ICD-10-CM

## 2025-07-30 DIAGNOSIS — Z12.31 SCREENING MAMMOGRAM FOR BREAST CANCER: ICD-10-CM

## 2025-07-30 DIAGNOSIS — R73.01 IFG (IMPAIRED FASTING GLUCOSE): ICD-10-CM

## 2025-07-30 DIAGNOSIS — Z85.3 HISTORY OF BREAST CANCER: ICD-10-CM

## 2025-07-30 DIAGNOSIS — E16.1 HYPERINSULINISM: ICD-10-CM

## 2025-07-30 DIAGNOSIS — E66.813 CLASS 3 SEVERE OBESITY DUE TO EXCESS CALORIES WITH SERIOUS COMORBIDITY AND BODY MASS INDEX (BMI) OF 50.0 TO 59.9 IN ADULT: ICD-10-CM

## 2025-07-30 DIAGNOSIS — I10 BENIGN ESSENTIAL HYPERTENSION: ICD-10-CM

## 2025-07-30 PROCEDURE — 3078F DIAST BP <80 MM HG: CPT | Mod: CPTII,,, | Performed by: FAMILY MEDICINE

## 2025-07-30 PROCEDURE — 3008F BODY MASS INDEX DOCD: CPT | Mod: CPTII,,, | Performed by: FAMILY MEDICINE

## 2025-07-30 PROCEDURE — 3044F HG A1C LEVEL LT 7.0%: CPT | Mod: CPTII,,, | Performed by: FAMILY MEDICINE

## 2025-07-30 PROCEDURE — 1159F MED LIST DOCD IN RCRD: CPT | Mod: CPTII,,, | Performed by: FAMILY MEDICINE

## 2025-07-30 PROCEDURE — 99396 PREV VISIT EST AGE 40-64: CPT | Mod: ,,, | Performed by: FAMILY MEDICINE

## 2025-07-30 PROCEDURE — 4010F ACE/ARB THERAPY RXD/TAKEN: CPT | Mod: CPTII,,, | Performed by: FAMILY MEDICINE

## 2025-07-30 PROCEDURE — 1160F RVW MEDS BY RX/DR IN RCRD: CPT | Mod: CPTII,,, | Performed by: FAMILY MEDICINE

## 2025-07-30 PROCEDURE — 3075F SYST BP GE 130 - 139MM HG: CPT | Mod: CPTII,,, | Performed by: FAMILY MEDICINE

## 2025-07-30 NOTE — PROGRESS NOTES
"SUBJECTIVE:  HPI    Ling Cool is a 60 y.o. female here for Wellness.   History of Present Illness    CHIEF COMPLAINT:  Patient presents today for follow up    ALLERGIES:  She takes Claritin 10 mg daily for allergy management. She experiences at least one sneeze spell daily with associated nasal congestion. She previously attempted nasal spray treatments but discontinued due to recurrent nosebleeds. Her allergies are more pronounced due to changes in her routine and reduced time at home.    MEDICATIONS:  Her medications were recently refilled during the first week of July.            Leilanis allergies, medications, history, and problem list were updated as appropriate.    ROS:  Pertinent ROS as above, otherwise negative    OBJECTIVE:  Vital signs  Visit Vitals  /74 (BP Location: Left arm, Patient Position: Sitting)   Pulse 65   Temp 98.1 °F (36.7 °C) (Temporal)   Ht 5' 7.01" (1.702 m)   Wt (!) 171.9 kg (379 lb)   SpO2 95%   BMI 59.35 kg/m²          PHYSICAL EXAM:  General: Awake, alert, no acute distress, obese  Neck:  No bruits, no masses  Cardiovascular:  Regular rhythm.  Normal rate.  No murmurs.  Respiratory: Clear to auscultation bilaterally, normal effort  Extremities:  No cyanosis, no clubbing, no edema  Skin:  No rashes or appreciable lesions   Neuro:  Cranial nerves 2-12 are intact with usual testing.  Gait is normal.  Moves all 4 extremities equally and symmetrically.  Psychiatric:  Normal mood and affect.    Chemistry:  Lab Results   Component Value Date     07/18/2025    K 4.9 07/18/2025    BUN 14 07/18/2025    CREATININE 0.79 07/18/2025    EGFRNORACEVR 85 07/18/2025    CALCIUM 9.3 07/18/2025    AST 20 07/18/2025    ALT 14 07/18/2025    TSH 1.830 07/18/2025        Lab Results   Component Value Date    HGBA1C 5.7 (H) 07/18/2025        Hematology:  Lab Results   Component Value Date    WBC 5.6 07/18/2025    HGB 13.9 07/18/2025    MCV 96 07/18/2025     07/18/2025       Lipid Panel:  Lab " Results   Component Value Date    CHOL 186 07/18/2025    HDL 62 07/18/2025    TRIG 92 07/18/2025        ASSESSMENT/PLAN:  1. Encounter for well adult exam with abnormal findings  -     Lipid Panel; Future; Expected date: 07/30/2026  -     CBC Auto Differential; Future; Expected date: 07/30/2026  -     Comprehensive Metabolic Panel; Future; Expected date: 07/30/2026  -     TSH; Future; Expected date: 07/30/2026  -     Hemoglobin A1C; Future; Expected date: 07/30/2026    2. Benign essential hypertension  Assessment & Plan:  Controlled on lisinopril 2.5 mg daily    Orders:  -     Lipid Panel; Future; Expected date: 07/30/2026  -     CBC Auto Differential; Future; Expected date: 07/30/2026  -     Comprehensive Metabolic Panel; Future; Expected date: 07/30/2026  -     TSH; Future; Expected date: 07/30/2026    3. IFG (impaired fasting glucose)  Overview:  Lab Results   Component Value Date    HGBA1C 5.7 (H) 07/18/2025         Assessment & Plan:  Therapeutic lifestyle changes to include low-carbohydrate, low-fat diet and regular exercise    Orders:  -     Lipid Panel; Future; Expected date: 07/30/2026  -     Hemoglobin A1C; Future; Expected date: 07/30/2026    4. Hyperinsulinism  Assessment & Plan:  Therapeutic lifestyle changes as above    Orders:  -     Hemoglobin A1C; Future; Expected date: 07/30/2026    5. Acquired hypothyroidism  Overview:  Lab Results   Component Value Date    TSH 1.830 07/18/2025         Assessment & Plan:  Stable and TSH at goal on levothyroxine 100 mcg daily      6. Class 3 severe obesity due to excess calories with serious comorbidity and body mass index (BMI) of 50.0 to 59.9 in adult  Assessment & Plan:  Therapeutic lifestyle changes to include regular exercise and weight loss.      7. Need for hepatitis C screening test  -     Hepatitis C Antibody; Future; Expected date: 07/30/2026    8. Screening mammogram for breast cancer  -     Mammo Digital Screening Right; Future; Expected date:  07/30/2025    9. History of breast cancer  Overview:  2006, status post left mastectomy    Assessment & Plan:  Mammogram of right breast    Orders:  -     Mammo Digital Screening Right; Future; Expected date: 07/30/2025        Follow Up:  Follow up in about 1 year (around 7/30/2026) for Well Adult, chronic health conditions, Fasting labs.      This note was generated with the assistance of ambient listening technology. Verbal consent was obtained by the patient and accompanying visitor(s) for the recording of patient appointment to facilitate this note. I attest to having reviewed and edited the generated note for accuracy, though some syntax or spelling errors may persist. Please contact the author of this note for any clarification.

## 2025-08-11 ENCOUNTER — PATIENT MESSAGE (OUTPATIENT)
Facility: CLINIC | Age: 60
End: 2025-08-11
Payer: COMMERCIAL

## 2025-08-13 ENCOUNTER — HOSPITAL ENCOUNTER (OUTPATIENT)
Dept: RADIOLOGY | Facility: HOSPITAL | Age: 60
Discharge: HOME | End: 2025-08-13
Attending: FAMILY MEDICINE
Payer: COMMERCIAL

## 2025-08-13 DIAGNOSIS — Z85.3 HISTORY OF BREAST CANCER: ICD-10-CM

## 2025-08-13 DIAGNOSIS — Z12.31 SCREENING MAMMOGRAM FOR BREAST CANCER: ICD-10-CM

## 2025-08-13 PROCEDURE — 77067 SCR MAMMO BI INCL CAD: CPT | Mod: 26,52,, | Performed by: STUDENT IN AN ORGANIZED HEALTH CARE EDUCATION/TRAINING PROGRAM

## 2025-08-13 PROCEDURE — 77063 BREAST TOMOSYNTHESIS BI: CPT | Mod: 26,52,, | Performed by: STUDENT IN AN ORGANIZED HEALTH CARE EDUCATION/TRAINING PROGRAM

## 2025-08-13 PROCEDURE — 77063 BREAST TOMOSYNTHESIS BI: CPT | Mod: TC,52
